# Patient Record
Sex: FEMALE | Race: WHITE | NOT HISPANIC OR LATINO | Employment: FULL TIME | RURAL
[De-identification: names, ages, dates, MRNs, and addresses within clinical notes are randomized per-mention and may not be internally consistent; named-entity substitution may affect disease eponyms.]

---

## 2022-03-04 PROBLEM — J30.9 ALLERGIC RHINITIS, UNSPECIFIED ALLERGIC RHINITIS TYPE: Status: ACTIVE | Noted: 2022-03-04

## 2022-03-04 PROBLEM — F17.200 TOBACCO USE DISORDER: Status: ACTIVE | Noted: 2022-03-04

## 2022-03-04 PROBLEM — Z12.4 SCREENING FOR MALIGNANT NEOPLASM OF CERVIX: Status: ACTIVE | Noted: 2022-03-04

## 2022-03-04 PROBLEM — I73.00 RAYNAUD PHENOMENON: Status: ACTIVE | Noted: 2022-03-04

## 2022-03-04 PROBLEM — Z00.00 ANNUAL PHYSICAL EXAM: Status: ACTIVE | Noted: 2022-03-04

## 2022-03-09 ENCOUNTER — OFFICE VISIT (OUTPATIENT)
Dept: FAMILY MEDICINE CLINIC | Facility: CLINIC | Age: 22
End: 2022-03-09

## 2022-03-09 VITALS
OXYGEN SATURATION: 97 % | HEIGHT: 67 IN | SYSTOLIC BLOOD PRESSURE: 132 MMHG | TEMPERATURE: 97.3 F | BODY MASS INDEX: 45.99 KG/M2 | RESPIRATION RATE: 18 BRPM | WEIGHT: 293 LBS | HEART RATE: 102 BPM | DIASTOLIC BLOOD PRESSURE: 82 MMHG

## 2022-03-09 DIAGNOSIS — Z11.59 NEED FOR HEPATITIS C SCREENING TEST: ICD-10-CM

## 2022-03-09 DIAGNOSIS — Z13.220 SCREENING FOR HYPERLIPIDEMIA: ICD-10-CM

## 2022-03-09 DIAGNOSIS — Z11.8 SCREENING FOR CHLAMYDIAL DISEASE: ICD-10-CM

## 2022-03-09 DIAGNOSIS — Z30.46 ENCOUNTER FOR REMOVAL AND REINSERTION OF NEXPLANON: ICD-10-CM

## 2022-03-09 DIAGNOSIS — F17.200 TOBACCO USE DISORDER: ICD-10-CM

## 2022-03-09 DIAGNOSIS — Z00.00 ANNUAL PHYSICAL EXAM: Primary | ICD-10-CM

## 2022-03-09 DIAGNOSIS — E66.01 CLASS 3 SEVERE OBESITY DUE TO EXCESS CALORIES WITHOUT SERIOUS COMORBIDITY WITH BODY MASS INDEX (BMI) OF 50.0 TO 59.9 IN ADULT: ICD-10-CM

## 2022-03-09 LAB
B-HCG UR QL: NEGATIVE
BILIRUB BLD-MCNC: NEGATIVE MG/DL
CLARITY, POC: CLEAR
COLOR UR: YELLOW
EXPIRATION DATE: NORMAL
GLUCOSE UR STRIP-MCNC: NEGATIVE MG/DL
INTERNAL NEGATIVE CONTROL: NEGATIVE
INTERNAL POSITIVE CONTROL: POSITIVE
KETONES UR QL: NEGATIVE
LEUKOCYTE EST, POC: NEGATIVE
Lab: NORMAL
NITRITE UR-MCNC: NEGATIVE MG/ML
PH UR: 5 [PH] (ref 5–8)
PROT UR STRIP-MCNC: ABNORMAL MG/DL
RBC # UR STRIP: NEGATIVE /UL
SP GR UR: 1.02 (ref 1–1.03)
UROBILINOGEN UR QL: NORMAL

## 2022-03-09 PROCEDURE — 99395 PREV VISIT EST AGE 18-39: CPT | Performed by: FAMILY MEDICINE

## 2022-03-09 PROCEDURE — 81025 URINE PREGNANCY TEST: CPT | Performed by: FAMILY MEDICINE

## 2022-03-09 PROCEDURE — 36415 COLL VENOUS BLD VENIPUNCTURE: CPT | Performed by: FAMILY MEDICINE

## 2022-03-09 PROCEDURE — 81002 URINALYSIS NONAUTO W/O SCOPE: CPT | Performed by: FAMILY MEDICINE

## 2022-03-09 PROCEDURE — 11982 REMOVE DRUG IMPLANT DEVICE: CPT | Performed by: FAMILY MEDICINE

## 2022-03-09 RX ORDER — CHOLECALCIFEROL (VITAMIN D3) 125 MCG
5 CAPSULE ORAL
COMMUNITY

## 2022-03-09 RX ORDER — CITALOPRAM 10 MG/1
10 TABLET ORAL DAILY
COMMUNITY

## 2022-03-09 RX ORDER — ETONOGESTREL AND ETHINYL ESTRADIOL 11.7; 2.7 MG/1; MG/1
INSERT, EXTENDED RELEASE VAGINAL
Qty: 1 EACH | Refills: 12 | Status: SHIPPED | OUTPATIENT
Start: 2022-03-09

## 2022-03-09 NOTE — PROGRESS NOTES
Chief Complaint  Annual Exam and Contraception    Subjective     CC  Problem List  Visit Diagnosis   Encounters  Notes  Medications  Labs  Result Review Imaging  Media    Dena Thakur presents to [facility] for an annual exam. The patient is here: for coordination of medical care to discuss health maintenance and disease prevention. Overall has: minimal activity with work/home activities, good appetite, feels well with minor complaints, decreased energy level and is sleeping poorly. Nutrition: eating a variety of foods. Last tetanus shot was 2017.     Contraception:  Dena Thakur is here today discuss contraception methods. She is -0, Parity-0. LMP- has IUD  She is of childbearing age She is sexually active.  The reason for contraception include pregnancy prevention Associated symptoms include weight gain. Mentruation pattern is described as not having due to nexplanon placement on 04/15/2019. Current contraceptive treatment includes IUD. Dena would like to remove the nexplanon. She desires to use a Nuva ring. The increase in estrogen and increase risk of clott and breast cancer were discussed.       Review of Systems   Constitutional: Negative for activity change, appetite change, fatigue, fever, unexpected weight gain and unexpected weight loss.   HENT: Negative for congestion, ear pain, hearing loss, rhinorrhea, sinus pressure, sore throat, swollen glands, trouble swallowing and voice change.    Eyes: Negative for visual disturbance.   Respiratory: Negative for apnea, cough, shortness of breath and wheezing.    Cardiovascular: Negative for chest pain and palpitations.   Gastrointestinal: Negative for abdominal pain, blood in stool, constipation, diarrhea, nausea, vomiting and indigestion.   Endocrine: Negative for cold intolerance, heat intolerance, polydipsia and polyuria.   Genitourinary: Negative for breast discharge, breast lump, breast pain, dysuria,  "flank pain, frequency, pelvic pain and vaginal bleeding.   Musculoskeletal: Negative for arthralgias, joint swelling and myalgias.   Skin: Negative for rash, skin lesions and wound.   Allergic/Immunologic: Negative for environmental allergies and immunocompromised state.   Neurological: Negative for dizziness, syncope, weakness, numbness, headache and memory problem.   Hematological: Negative for adenopathy. Does not bruise/bleed easily.   Psychiatric/Behavioral: Negative for suicidal ideas and depressed mood. The patient is not nervous/anxious.         Objective   Vital Signs:   /82 (BP Location: Right arm, Patient Position: Sitting, Cuff Size: Adult)   Pulse 102   Temp 97.3 °F (36.3 °C) (Temporal)   Resp 18   Ht 170.2 cm (67\")   Wt (!) 161 kg (355 lb)   SpO2 97% Comment: room air  BMI 55.60 kg/m²     Physical Exam  Constitutional:       General: She is not in acute distress.     Appearance: She is well-developed. She is obese.   HENT:      Head: Normocephalic. Hair is normal.      Right Ear: Tympanic membrane and external ear normal.      Left Ear: Tympanic membrane and external ear normal.      Nose: Nose normal. No mucosal edema.      Mouth/Throat:      Pharynx: Uvula midline.   Eyes:      General:         Right eye: No discharge.         Left eye: No discharge.      Conjunctiva/sclera: Conjunctivae normal.      Pupils: Pupils are equal, round, and reactive to light.   Neck:      Thyroid: No thyromegaly.   Cardiovascular:      Rate and Rhythm: Normal rate and regular rhythm.      Chest Wall: PMI is not displaced.      Pulses:           Carotid pulses are 2+ on the right side and 2+ on the left side.       Femoral pulses are 2+ on the right side and 2+ on the left side.       Dorsalis pedis pulses are 2+ on the right side and 2+ on the left side.      Heart sounds: Normal heart sounds. No murmur heard.    No friction rub. No gallop.      Comments: Negative Homans' no edema  Pulmonary:      Effort: " Pulmonary effort is normal. No respiratory distress.      Breath sounds: Normal breath sounds. No decreased breath sounds, wheezing, rhonchi or rales.   Chest:   Breasts: Breasts are symmetrical.      Right: No inverted nipple, mass, nipple discharge, skin change, tenderness or supraclavicular adenopathy.      Left: No inverted nipple, mass, nipple discharge, skin change, tenderness or supraclavicular adenopathy.       Abdominal:      General: Bowel sounds are normal. There is no distension or abdominal bruit.      Palpations: Abdomen is soft. There is no mass.      Tenderness: There is no abdominal tenderness.   Musculoskeletal:         General: No tenderness or deformity. Normal range of motion.      Cervical back: Normal range of motion and neck supple. No muscular tenderness.   Lymphadenopathy:      Cervical: No cervical adenopathy.      Upper Body:      Right upper body: No supraclavicular adenopathy.      Left upper body: No supraclavicular adenopathy.   Skin:     General: Skin is warm and dry.      Findings: No ecchymosis, erythema, lesion or rash.      Comments: There is a superficial nexplanon pellet in the left arm flexor surface.    Neurological:      Mental Status: She is alert and oriented to person, place, and time.      Cranial Nerves: No cranial nerve deficit.      Sensory: No sensory deficit.      Motor: No abnormal muscle tone.      Coordination: Coordination normal.      Deep Tendon Reflexes: Reflexes are normal and symmetric. Reflexes normal.   Psychiatric:         Speech: Speech normal.         Behavior: Behavior normal.         Thought Content: Thought content normal. Thought content does not include suicidal ideation.         Cognition and Memory: She does not exhibit impaired recent memory or impaired remote memory.         Judgment: Judgment normal. Judgment is not impulsive.        NEXPLANON REMOVAL    The nexplanon implant was identified in the distal 1/3 of the flexor surface of the left  arm. The area was cleansed with betadine, anesthestized with 2 % lidocaine with epinephrine. A 2 mm incision was made at the proximal end of the implant with blunt dissection the pellet was identified and removed with gentle traction. Bleeding was minimal. She left the office in stable condition.       Result Review :Labs  Result Review  Imaging  Med Tab  Media                 Assessment and Plan CC Problem List  Visit Diagnosis  ROS  Review (Popup)  Health Maintenance  Quality  BestPractice  Medications  SmartSets  SnapShot Encounters  Media  Problem List Items Addressed This Visit        Unprioritized    Annual physical exam - Primary    Relevant Medications    etonogestrel-ethinyl estradiol (NuvaRing) 0.12-0.015 MG/24HR vaginal ring    Other Relevant Orders    POCT urinalysis dipstick, manual (Completed)    CBC & Differential (Completed)    Comprehensive Metabolic Panel (Completed)    TSH (Completed)    Tobacco use disorder    Overview     Cessation and techniques discussed especially inlieu of higher concentration contraceptives.              Other Visit Diagnoses     Encounter for removal and reinsertion of Nexplanon        REmoval only, she will begin Nuva ring today and follow    Relevant Orders    POCT pregnancy, urine (Completed)    Screening for hyperlipidemia        Relevant Orders    Lipid Panel With / Chol / HDL Ratio (Completed)    Need for hepatitis C screening test        Relevant Orders    Hepatitis C Antibody (Completed)    Screening for chlamydial disease        Relevant Orders    Chlamydia trachomatis, Neisseria gonorrhoeae, PCR - Urine, Urine, Clean Catch (Completed)          Follow Up Instructions Charge Capture  Follow-up Communications  Return in about 1 year (around 3/9/2023), or problems.  Patient was given instructions and counseling regarding her condition or for health maintenance advice. Please see specific information pulled into the AVS if appropriate.      Site  care done- cleaned with alcohol swab, procedure tolerated well, dressing applied. Venipuncture was obtained after 1 time(s). 2 tubes were drawn. Needle gauge used was 23-butterfly.

## 2022-03-10 LAB
ALBUMIN SERPL-MCNC: 4.6 G/DL (ref 3.9–5)
ALBUMIN/GLOB SERPL: 1.6 {RATIO} (ref 1.2–2.2)
ALP SERPL-CCNC: 104 IU/L (ref 42–106)
ALT SERPL-CCNC: 20 IU/L (ref 0–32)
AST SERPL-CCNC: 18 IU/L (ref 0–40)
BASOPHILS # BLD AUTO: 0 X10E3/UL (ref 0–0.2)
BASOPHILS NFR BLD AUTO: 0 %
BILIRUB SERPL-MCNC: 0.4 MG/DL (ref 0–1.2)
BUN SERPL-MCNC: 10 MG/DL (ref 6–20)
BUN/CREAT SERPL: 16 (ref 9–23)
CALCIUM SERPL-MCNC: 9.6 MG/DL (ref 8.7–10.2)
CHLORIDE SERPL-SCNC: 103 MMOL/L (ref 96–106)
CHOLEST SERPL-MCNC: 128 MG/DL (ref 100–199)
CHOLEST/HDLC SERPL: 2.7 RATIO (ref 0–4.4)
CO2 SERPL-SCNC: 22 MMOL/L (ref 20–29)
CREAT SERPL-MCNC: 0.63 MG/DL (ref 0.57–1)
EGFR GENE MUT ANL BLD/T: 130 ML/MIN/1.73
EOSINOPHIL # BLD AUTO: 0.2 X10E3/UL (ref 0–0.4)
EOSINOPHIL NFR BLD AUTO: 2 %
ERYTHROCYTE [DISTWIDTH] IN BLOOD BY AUTOMATED COUNT: 12.4 % (ref 11.7–15.4)
GLOBULIN SER CALC-MCNC: 2.9 G/DL (ref 1.5–4.5)
GLUCOSE SERPL-MCNC: 71 MG/DL (ref 65–99)
HCT VFR BLD AUTO: 38.5 % (ref 34–46.6)
HCV AB S/CO SERPL IA: <0.1 S/CO RATIO (ref 0–0.9)
HDLC SERPL-MCNC: 47 MG/DL
HGB BLD-MCNC: 12.9 G/DL (ref 11.1–15.9)
IMM GRANULOCYTES # BLD AUTO: 0 X10E3/UL (ref 0–0.1)
IMM GRANULOCYTES NFR BLD AUTO: 0 %
LDLC SERPL CALC-MCNC: 69 MG/DL (ref 0–99)
LYMPHOCYTES # BLD AUTO: 2.9 X10E3/UL (ref 0.7–3.1)
LYMPHOCYTES NFR BLD AUTO: 35 %
MCH RBC QN AUTO: 30.2 PG (ref 26.6–33)
MCHC RBC AUTO-ENTMCNC: 33.5 G/DL (ref 31.5–35.7)
MCV RBC AUTO: 90 FL (ref 79–97)
MONOCYTES # BLD AUTO: 0.5 X10E3/UL (ref 0.1–0.9)
MONOCYTES NFR BLD AUTO: 6 %
NEUTROPHILS # BLD AUTO: 4.7 X10E3/UL (ref 1.4–7)
NEUTROPHILS NFR BLD AUTO: 57 %
PLATELET # BLD AUTO: 251 X10E3/UL (ref 150–450)
POTASSIUM SERPL-SCNC: 3.8 MMOL/L (ref 3.5–5.2)
PROT SERPL-MCNC: 7.5 G/DL (ref 6–8.5)
RBC # BLD AUTO: 4.27 X10E6/UL (ref 3.77–5.28)
SODIUM SERPL-SCNC: 141 MMOL/L (ref 134–144)
TRIGL SERPL-MCNC: 56 MG/DL (ref 0–149)
TSH SERPL DL<=0.005 MIU/L-ACNC: 2.21 UIU/ML (ref 0.45–4.5)
VLDLC SERPL CALC-MCNC: 12 MG/DL (ref 5–40)
WBC # BLD AUTO: 8.3 X10E3/UL (ref 3.4–10.8)

## 2022-03-11 LAB
C TRACH RRNA SPEC QL NAA+PROBE: NEGATIVE
N GONORRHOEA RRNA SPEC QL NAA+PROBE: NEGATIVE

## 2022-05-27 PROBLEM — E66.01 CLASS 3 SEVERE OBESITY WITHOUT SERIOUS COMORBIDITY WITH BODY MASS INDEX (BMI) OF 50.0 TO 59.9 IN ADULT: Status: ACTIVE | Noted: 2022-05-27
